# Patient Record
Sex: MALE | Race: WHITE | NOT HISPANIC OR LATINO | Employment: STUDENT | URBAN - METROPOLITAN AREA
[De-identification: names, ages, dates, MRNs, and addresses within clinical notes are randomized per-mention and may not be internally consistent; named-entity substitution may affect disease eponyms.]

---
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
## 2024-06-20 ENCOUNTER — LAB (OUTPATIENT)
Dept: LAB | Facility: LAB | Age: 19
End: 2024-06-20
Payer: COMMERCIAL

## 2024-06-20 ENCOUNTER — APPOINTMENT (OUTPATIENT)
Dept: PRIMARY CARE | Facility: CLINIC | Age: 19
End: 2024-06-20
Payer: COMMERCIAL

## 2024-06-20 VITALS
BODY MASS INDEX: 23.06 KG/M2 | RESPIRATION RATE: 16 BRPM | DIASTOLIC BLOOD PRESSURE: 76 MMHG | WEIGHT: 174 LBS | HEIGHT: 73 IN | HEART RATE: 56 BPM | SYSTOLIC BLOOD PRESSURE: 104 MMHG | TEMPERATURE: 97.3 F

## 2024-06-20 DIAGNOSIS — J34.2 NASAL SEPTAL DEVIATION: Primary | ICD-10-CM

## 2024-06-20 DIAGNOSIS — T78.40XA ALLERGY, INITIAL ENCOUNTER: ICD-10-CM

## 2024-06-20 PROCEDURE — 86003 ALLG SPEC IGE CRUDE XTRC EA: CPT

## 2024-06-20 PROCEDURE — 82785 ASSAY OF IGE: CPT

## 2024-06-20 PROCEDURE — 99213 OFFICE O/P EST LOW 20 MIN: CPT | Performed by: FAMILY MEDICINE

## 2024-06-20 PROCEDURE — 36415 COLL VENOUS BLD VENIPUNCTURE: CPT

## 2024-06-20 ASSESSMENT — ENCOUNTER SYMPTOMS
CHILLS: 0
SORE THROAT: 0
TROUBLE SWALLOWING: 0
FEVER: 0
SINUS PRESSURE: 1
ABDOMINAL PAIN: 0
RHINORRHEA: 1
SHORTNESS OF BREATH: 0
PALPITATIONS: 0
CHEST TIGHTNESS: 0

## 2024-06-20 NOTE — PROGRESS NOTES
"Subjective   Patient ID: Angelo Salinas is a 18 y.o. male who presents for Follow-up (Pt is here to discus a referral for a deviated septum and passable seasonal allergies and food allergies.  Pt states he always has trouble breathing from the left nostril. ).    HPI     Review of Systems   Constitutional:  Negative for chills and fever.   HENT:  Positive for congestion, rhinorrhea, sinus pressure and sneezing. Negative for sore throat and trouble swallowing.    Respiratory:  Negative for chest tightness and shortness of breath.    Cardiovascular:  Negative for chest pain, palpitations and leg swelling.   Gastrointestinal:  Negative for abdominal pain.   Skin:  Negative for rash.       Objective   /76   Pulse 56   Temp 36.3 °C (97.3 °F)   Resp 16   Ht 1.854 m (6' 1\")   Wt 78.9 kg (174 lb)   BMI 22.96 kg/m²     Physical Exam  Constitutional:       Appearance: Normal appearance.   HENT:      Head: Normocephalic.      Nose: Septal deviation and congestion present.      Left Nostril: Occlusion present.   Cardiovascular:      Rate and Rhythm: Normal rate and regular rhythm.   Pulmonary:      Effort: Pulmonary effort is normal.   Musculoskeletal:      Cervical back: Neck supple.         Assessment/Plan   Problem List Items Addressed This Visit    None  Visit Diagnoses         Codes    Nasal septal deviation    -  Primary J34.2    Relevant Orders    Referral to ENT    Allergy, initial encounter     T78.40XA    Relevant Orders    Respiratory Allergy Profile IgE    Food Allergy Profile IgE               "

## 2024-06-21 LAB
A ALTERNATA IGE QN: <0.1 KU/L
A FUMIGATUS IGE QN: <0.1 KU/L
BERMUDA GRASS IGE QN: 0.62 KU/L
BOXELDER IGE QN: 0.6 KU/L
C HERBARUM IGE QN: <0.1 KU/L
CALIF WALNUT POLN IGE QN: 0.67 KU/L
CAT DANDER IGE QN: <0.1 KU/L
CLAM IGE QN: 0.2 KU/L
CMN PIGWEED IGE QN: 0.65 KU/L
CODFISH IGE QN: <0.1 KU/L
COMMON RAGWEED IGE QN: 0.44 KU/L
CORN IGE QN: 0.97
COTTONWOOD IGE QN: 0.88 KU/L
D FARINAE IGE QN: 0.13 KU/L
D PTERONYSS IGE QN: <0.1 KU/L
DOG DANDER IGE QN: <0.1 KU/L
EGG WHITE IGE QN: <0.1 KU/L
ENGL PLANTAIN IGE QN: 0.53 KU/L
GOOSEFOOT IGE QN: 0.43 KU/L
JOHNSON GRASS IGE QN: 0.51 KU/L
KENT BLUE GRASS IGE QN: 0.56 KU/L
LONDON PLANE IGE QN: 0.91 KU/L
MILK IGE QN: 0.17 KU/L
MT JUNIPER IGE QN: 0.58 KU/L
P NOTATUM IGE QN: <0.1 KU/L
PEANUT IGE QN: 0.87 KU/L
PECAN/HICK TREE IGE QN: 0.49 KU/L
ROACH IGE QN: 0.25 KU/L
SALTWORT IGE QN: 0.43 KU/L
SCALLOP IGE QN: 0.17 KU/L
SESAME SEED IGE QN: 1.02 KU/L
SHEEP SORREL IGE QN: 0.47 KU/L
SHRIMP IGE QN: <0.1 KU/L
SILVER BIRCH IGE QN: 0.53 KU/L
SOYBEAN IGE QN: 0.46 KU/L
TIMOTHY IGE QN: 0.54 KU/L
TOTAL IGE SMQN RAST: 64.7 KU/L
WALNUT IGE QN: 0.7 KU/L
WHEAT IGE QN: 0.57 KU/L
WHITE ASH IGE QN: 0.76 KU/L
WHITE ELM IGE QN: 0.9 KU/L
WHITE MULBERRY IGE QN: 0.31 KU/L
WHITE OAK IGE QN: 0.6 KU/L

## 2024-07-02 ENCOUNTER — APPOINTMENT (OUTPATIENT)
Dept: OTOLARYNGOLOGY | Facility: CLINIC | Age: 19
End: 2024-07-02
Payer: COMMERCIAL

## 2024-07-02 DIAGNOSIS — J34.2 DNS (DEVIATED NASAL SEPTUM): Primary | ICD-10-CM

## 2024-07-02 PROCEDURE — 99203 OFFICE O/P NEW LOW 30 MIN: CPT | Performed by: PHYSICIAN ASSISTANT

## 2024-07-02 NOTE — PROGRESS NOTES
Angelo Salinas is a 18 y.o. year old male patient with Deviated Nasal Septum and Nasal Congestion     Patient presents to the office today for assessment of his nose.  Patient is here complaining of chronic neck congestion obstruction which seems to be worse left greater than right.  He states that this is been ongoing for several years but reports that this is increased.  He is utilize nasal steroids with minimal effectiveness.  He does have prior history of trauma which he states was orbital blowout fracture on the left side back in 2020.  He denies nasal bone fracture at that time.  He reports that he is not experiencing recurrent infection only nasal obstruction.  He does suffer from allergic rhinitis and does utilize antihistamine.  All other ENT issues are negative.      Review of Systems   All other systems reviewed and are negative.        Physical Exam:   General appearance: No acute distress. Normal facies. Symmetric facial movement. No gross lesions of the face are noted.  The external ear structures appear normal. The ear canals patent and the tympanic membranes are intact without evidence of air-fluid levels, retraction, or congenital defects.  Nasal septal deviation is noted to the left anteriorly right nasal vault patient is noted for deviation/septal splayed to the right with deformity.  Patient has prominent inferior nasal turbinates.  Examination is noted for normal healthy mucosal membranes without any evidence of lesions, polyps, or exudate. The tongue is normally mobile. There are no lesions on the gingiva, buccal, or oral mucosa. There are no oral cavity masses.  The neck is negative for mass lymphadenopathy. The trachea and parotid are clear. The thyroid bed is grossly unremarkable. The salivary gland structures are grossly unremarkable.    Assessment/Plan   1.  Deviated nasal septum  Patient seen in the office today for chronic nasal congestion and obstruction with nasal septal deviation.   The severity of the septal deviation and nasal obstruction the patient will be set up for dedicated CT imaging of the sinuses.  Will see the patient back following CT to review and discuss definitive septoplasty and turbinate surgery.

## 2024-07-16 ENCOUNTER — HOSPITAL ENCOUNTER (OUTPATIENT)
Dept: RADIOLOGY | Facility: HOSPITAL | Age: 19
Discharge: HOME | End: 2024-07-16
Payer: COMMERCIAL

## 2024-07-16 DIAGNOSIS — J34.2 DNS (DEVIATED NASAL SEPTUM): ICD-10-CM

## 2024-07-16 PROCEDURE — 70486 CT MAXILLOFACIAL W/O DYE: CPT | Performed by: RADIOLOGY

## 2024-07-16 PROCEDURE — 70486 CT MAXILLOFACIAL W/O DYE: CPT

## 2024-07-19 ENCOUNTER — OFFICE VISIT (OUTPATIENT)
Dept: PRIMARY CARE | Facility: CLINIC | Age: 19
End: 2024-07-19
Payer: COMMERCIAL

## 2024-07-19 VITALS
HEART RATE: 62 BPM | HEIGHT: 73 IN | BODY MASS INDEX: 22.53 KG/M2 | DIASTOLIC BLOOD PRESSURE: 60 MMHG | OXYGEN SATURATION: 96 % | SYSTOLIC BLOOD PRESSURE: 100 MMHG | WEIGHT: 170 LBS | RESPIRATION RATE: 18 BRPM | TEMPERATURE: 98.1 F

## 2024-07-19 DIAGNOSIS — H66.001 ACUTE SUPPURATIVE OTITIS MEDIA OF RIGHT EAR WITHOUT SPONTANEOUS RUPTURE OF TYMPANIC MEMBRANE, RECURRENCE NOT SPECIFIED: Primary | ICD-10-CM

## 2024-07-19 PROCEDURE — 99213 OFFICE O/P EST LOW 20 MIN: CPT | Performed by: FAMILY MEDICINE

## 2024-07-19 RX ORDER — METHYLPREDNISOLONE 4 MG/1
TABLET ORAL
Qty: 21 TABLET | Refills: 0 | Status: SHIPPED | OUTPATIENT
Start: 2024-07-19 | End: 2024-07-26

## 2024-07-19 RX ORDER — AMOXICILLIN AND CLAVULANATE POTASSIUM 875; 125 MG/1; MG/1
875 TABLET, FILM COATED ORAL 2 TIMES DAILY
Qty: 20 TABLET | Refills: 0 | Status: SHIPPED | OUTPATIENT
Start: 2024-07-19 | End: 2024-07-29

## 2024-07-19 ASSESSMENT — ENCOUNTER SYMPTOMS
SORE THROAT: 0
TROUBLE SWALLOWING: 0
SHORTNESS OF BREATH: 0
VOMITING: 0
NECK PAIN: 0
ABDOMINAL PAIN: 0
CHILLS: 0
PALPITATIONS: 0
FEVER: 0

## 2024-07-19 NOTE — PROGRESS NOTES
"Subjective   Angelo Salinas is a 18 y.o. male who presents for Hospital Follow-up (07.15.24) and Ear Fullness.  Patient is here to check right ear which is having difficulty hearing.  Patient states did a Ct and got result back that his ear is full and a little tender when pressing.  When to ER Monday 07.15.24 and had IV, tested neg  for COVID, Flu.  [facility] Department   Diagnose  Headache,Vomiting          Ear Fullness   There is pain in the right ear. The current episode started 1 to 4 weeks ago. The problem occurs constantly. The problem has been unchanged. There has been no fever. Associated symptoms include hearing loss. Pertinent negatives include no abdominal pain, ear discharge, neck pain, rash, sore throat or vomiting. He has tried nothing for the symptoms.     Review of Systems   Constitutional:  Negative for chills and fever.   HENT:  Positive for ear pain and hearing loss. Negative for ear discharge, sore throat and trouble swallowing.    Respiratory:  Negative for shortness of breath.    Cardiovascular:  Negative for chest pain, palpitations and leg swelling.   Gastrointestinal:  Negative for abdominal pain and vomiting.   Musculoskeletal:  Negative for neck pain.   Skin:  Negative for rash.       Objective   Physical Exam  Constitutional:       Appearance: Normal appearance.   HENT:      Head: Normocephalic.      Left Ear: Decreased hearing noted. Drainage present. A middle ear effusion is present. Tympanic membrane is bulging.   Cardiovascular:      Rate and Rhythm: Normal rate and regular rhythm.   Pulmonary:      Effort: Pulmonary effort is normal.   Musculoskeletal:      Cervical back: Neck supple.   Skin:     General: Skin is warm and dry.       /60 (BP Location: Right arm, Patient Position: Sitting, BP Cuff Size: Small adult)   Pulse 62   Temp 36.7 °C (98.1 °F)   Resp 18   Ht 1.854 m (6' 1\")   Wt 77.1 kg (170 lb)   SpO2 96%   BMI 22.43 kg/m²       Assessment/Plan "   Problem List Items Addressed This Visit    None  Visit Diagnoses       Acute suppurative otitis media of right ear without spontaneous rupture of tympanic membrane, recurrence not specified    -  Primary    Relevant Medications    amoxicillin-pot clavulanate (Augmentin) 875-125 mg tablet    methylPREDNISolone (Medrol Dospak) 4 mg tablets

## 2024-07-26 ENCOUNTER — APPOINTMENT (OUTPATIENT)
Dept: OTOLARYNGOLOGY | Facility: CLINIC | Age: 19
End: 2024-07-26
Payer: COMMERCIAL

## 2024-07-26 DIAGNOSIS — J34.2 DNS (DEVIATED NASAL SEPTUM): ICD-10-CM

## 2024-07-26 DIAGNOSIS — H65.01 NON-RECURRENT ACUTE SEROUS OTITIS MEDIA OF RIGHT EAR: Primary | ICD-10-CM

## 2024-07-26 PROCEDURE — 99213 OFFICE O/P EST LOW 20 MIN: CPT | Performed by: OTOLARYNGOLOGY

## 2024-07-26 RX ORDER — PREDNISONE 20 MG/1
TABLET ORAL
Qty: 9 TABLET | Refills: 0 | Status: SHIPPED | OUTPATIENT
Start: 2024-07-26

## 2024-07-26 NOTE — PROGRESS NOTES
The patient returns.  We are seeing him back today with his completed CT scan paranasal sinuses for evaluation of chronic nasal obstruction.  That examination did reveal evidence of far anterior nasal deflection consistent with previous exam findings.  He has had previous trauma in the past.  Additionally, he notes blockage in the right ear with possible fluid over the last several weeks.  He denies any other worrisome ENT symptoms or signs.  No prior history of ear tubes.  There have been no significant changes in past medical or past surgical histories except as mentioned.    Physical exam:  No acute distress.  The right ear is noted for henry effusion consistent with acute serous otitis by an intact drum.  The left external ear structures appear normal. The ear canals patent and the tympanic membranes is intact without evidence of air-fluid levels, retraction, or congenital defects.  Anterior rhinoscopy notes significant buckling far anterior left septum with elongation into the columella by the nasal septum. Examination is noted for normal healthy mucosal membranes without any evidence of lesions, polyps, or exudate. The tongue is normally mobile. There are no lesions on the gingiva, buccal, or oral mucosa. There are no oral cavity masses.  The neck is negative for mass lymphadenopathy. The trachea and parotid are clear. The thyroid bed is grossly unremarkable. The salivary gland structures are grossly unremarkable.    Assessment and plan:  1.  Severe nasal septal deformity with associated external contour deformity.  Will recommend he see our dedicated colleague Dr. Oscar for consideration of intervention surgically.  2.  Right acute serous otitis media to be treated with a 7-day taper prednisone.  He will update me on his response in the next 10 days.  All questions were answered in this regard accordingly.

## 2024-07-29 ENCOUNTER — APPOINTMENT (OUTPATIENT)
Dept: PRIMARY CARE | Facility: CLINIC | Age: 19
End: 2024-07-29
Payer: COMMERCIAL

## 2024-07-29 VITALS
BODY MASS INDEX: 22.26 KG/M2 | HEART RATE: 84 BPM | RESPIRATION RATE: 16 BRPM | WEIGHT: 168 LBS | DIASTOLIC BLOOD PRESSURE: 70 MMHG | TEMPERATURE: 96.7 F | HEIGHT: 73 IN | SYSTOLIC BLOOD PRESSURE: 120 MMHG

## 2024-07-29 DIAGNOSIS — G47.9 SLEEP DISTURBANCE: ICD-10-CM

## 2024-07-29 DIAGNOSIS — H60.502 ACUTE OTITIS EXTERNA OF LEFT EAR, UNSPECIFIED TYPE: Primary | ICD-10-CM

## 2024-07-29 DIAGNOSIS — R06.83 SNORING: ICD-10-CM

## 2024-07-29 PROCEDURE — 99213 OFFICE O/P EST LOW 20 MIN: CPT | Performed by: FAMILY MEDICINE

## 2024-07-29 RX ORDER — CIPROFLOXACIN AND DEXAMETHASONE 3; 1 MG/ML; MG/ML
4 SUSPENSION/ DROPS AURICULAR (OTIC) 2 TIMES DAILY
Qty: 7.5 ML | Refills: 0 | Status: SHIPPED | OUTPATIENT
Start: 2024-07-29 | End: 2024-08-05

## 2024-07-29 NOTE — PROGRESS NOTES
"Subjective   Patient ID: Angelo Salinas is a 18 y.o. male who presents for Follow-up (2 week follow up for right ear pain. Pt states no pain but he has noticed when he moves his head he can not hear out of his ear.  He states he is now having the same issues with the left ear that started 2 days ago. ).    HPI     Review of Systems   Constitutional:  Negative for chills and fever.   HENT:  Positive for ear discharge and ear pain.    Respiratory:  Positive for apnea. Negative for shortness of breath.         Snoring   Cardiovascular:  Negative for chest pain, palpitations and leg swelling.   Skin:  Negative for rash.       Objective   /70   Pulse 84   Temp 35.9 °C (96.7 °F)   Resp 16   Ht 1.854 m (6' 1\")   Wt 76.2 kg (168 lb)   BMI 22.16 kg/m²     Physical Exam  Constitutional:       Appearance: Normal appearance.   HENT:      Head: Normocephalic.      Right Ear: Drainage, swelling and tenderness present.   Pulmonary:      Effort: Pulmonary effort is normal.   Musculoskeletal:      Cervical back: Neck supple.   Skin:     General: Skin is warm and dry.   Psychiatric:         Mood and Affect: Mood normal.         Assessment/Plan   Problem List Items Addressed This Visit    None  Visit Diagnoses         Codes    Acute otitis externa of left ear, unspecified type    -  Primary H60.502    Relevant Medications    ciprofloxacin-dexamethasone (CiproDEX) otic suspension    Snoring     R06.83    Sleep disturbance     G47.9               "

## 2024-07-31 ASSESSMENT — ENCOUNTER SYMPTOMS
SHORTNESS OF BREATH: 0
FEVER: 0
PALPITATIONS: 0
CHILLS: 0
APNEA: 1

## 2024-08-05 DIAGNOSIS — G47.9 SLEEP DISTURBANCE: ICD-10-CM

## 2024-08-05 DIAGNOSIS — R06.83 SNORING: Primary | ICD-10-CM

## 2024-08-06 ENCOUNTER — PATIENT MESSAGE (OUTPATIENT)
Dept: PRIMARY CARE | Facility: CLINIC | Age: 19
End: 2024-08-06
Payer: COMMERCIAL

## 2024-08-06 DIAGNOSIS — H69.90 DYSFUNCTION OF EUSTACHIAN TUBE, UNSPECIFIED LATERALITY: Primary | ICD-10-CM

## 2024-08-07 NOTE — PROGRESS NOTES
Facial Plastic & Reconstructive Surgery    Reason for consult: Nasal obstruction    Referring provider: Dr. Montoya, [facility]    Chief Complaint: Obstructed breathing    Constant, present year round, does not fluctuate. It affects the patients ability to sleep, exercise, and is troubling during the day.  Symptoms began: Lifelong; worsened after orbital blowout in 2020    Nasal steroid or spray: Trial of flonase x 3 months failed; no benefit.    Site of obstruction: Left >Right    Previous Otolaryngology Provider: Dr. Montoya  Previous documentation for this patient was extensively reviewed including specific reasons for evaluation. Complex nature of complaint and medical issues prompting evaluation for surgical consideration by facial plastic & reconstructive surgeon.    Previous surgery: No    Previous CT/MRI imaging of the nasal cavity and sinuses:  I personally reviewed the CT Sinus from 7/16/24 and this is my impression: Rightward septal deviation, left caudal deflection  Internal nasal valve collapse bilaterally with contact of the upper lateral cartilages to septum            Trauma history: Yes, as a young child a satellite fell on face    Sleep apnea or snoring history: Snores    Allergic rhinitis, sinusitis history: Seasonal allergies , 3-4 sinus infections per year since COVID    Smoking: No    Aesthetic concern: Yes    Past Medical History  He has a past medical history of Closed fracture of left orbital floor (Multi) (10/12/2023), Closed nondisplaced fracture of fourth metatarsal bone of right foot (10/12/2023), Fatigue (10/12/2023), Fracture of maxillary sinus (Multi) (10/12/2023), Juvenile osteochondrosis of lower extremity, 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    Surgical History  He has a past surgical history that includes Other surgical history  (01/22/2020).     Social History  He reports that he has never smoked. He has never used smokeless tobacco. He reports that he does not drink alcohol and does not use drugs.    Family History  No family history on file.     Allergies  Avocado (laurus persea)    Physical exam    General: Well-developed and well-nourished in appearance.  Skin: No rashes or concerning lesions on the visible portions of the skin.  Eyes: Extraocular movements intact. Visual fields grossly normal.  Ears: Pinna are normal in shape and position. External canals are patent.  Oral Cavity/Oropharynx: Dentition is intact. Mucous membranes moist. No masses or lesions.  Respiratory: No respiratory distress. Quiet breathing without stertor or stridor.  Cardiovascular: Regular rate and rhythm. Warm extremities with equal pulses.  Psych: Normal mood and affect. Judgement and insight appropriate.  Neuro: Alert and oriented. CN II-XII grossly intact. No focal deficits.  Musculoskeletal: Gait intact. Moves all extremities well without apparent deformities.    A comprehensive facial exam was performed with the following highlights:    Nasal skin type: Moderate sebaceous    External exam:  Tip: Bulbous  Tip rotation: Appropriate  Projection: Overprojected  Dorsum: Irregularities, dorsal hump  Base width: Appropriate  Asymmetries: Left nostril with caudal septum inclusion, dorsal irregularities,  Alar columellar relationship: Columnella deviated right    Internal exam:   Septum: S shaped Right to Left deviation with right caudal deflection  Inferior turbinates: Hypertrophic Bilateral  Nasal valve angle: Decreased bilaterally L>R, severely narrowed middle vault    Intranasal examination performed with limited view on anterior rhinoscopy.    Procedures:    Procedure: Rigid diagnostic nasal endoscopy  Surgeon: Stephen Oscar MD  Anesthesia: None  Timeout: Performed  Findings: A 0-degree rigid endoscope was passed through the patient's bilateral naris. The  first pass was along the floor of the nose to the nasopharynx. The second pass was to the area of the middle meatus. The third pass was to the sphenoethmoidal recess.    Septum: Deviation is S shaped with bilateral obstruction approximately 90% without perforations nor synechia.  Internal Nasal Valve: Angle is reduced, narrowing the nasal airway bilaterally.    Right nasal cavity:  Inferior turbinate: 2+  Inferior meatus: Clear, no discharge, no polyps/masses/lesions  Middle meatus: Clear, no discharge, no polyps/masses/lesions    Left nasal cavity:  Inferior turbinate: 2+  Inferior meatus: Clear, no discharge, no polyps/masses/lesions  Middle meatus: Clear, no discharge, no polyps/masses/lesions  Nasopharynx: Clear, no discharge, no masses/lesions    Modified Richardson Maneuver: Performed with a cotton tipped applicator, markedly improves breathing bilaterally.    Assessment - This patient has a significant mechanical nasal obstruction. The cause is multifactorial with septal deviation with severe internal nasal valve narrowing, turbinate hypertrophy, and static internal nasal valve collapse. There is some dynamic nasal valve collapse as well. Correction of this nasal obstruction will require a septoplasty, repair of nasal valve collapse with structural grafting, and a bilateral turbinate reduction. We discussed the medical necessity of this at length, as well as the risks and limitations of the procedures. All questions were answered.      Plan - Recommend septoplasty, nasal valve repair with structural grafting, and inferior turbinate reduction with lateralization. We discussed aesthetic concerns as well.

## 2024-08-08 ENCOUNTER — APPOINTMENT (OUTPATIENT)
Dept: OTOLARYNGOLOGY | Facility: CLINIC | Age: 19
End: 2024-08-08
Payer: COMMERCIAL

## 2024-08-08 ENCOUNTER — OFFICE VISIT (OUTPATIENT)
Dept: OTOLARYNGOLOGY | Facility: CLINIC | Age: 19
End: 2024-08-08
Payer: COMMERCIAL

## 2024-08-08 VITALS — HEIGHT: 74 IN | BODY MASS INDEX: 21.55 KG/M2 | TEMPERATURE: 97.8 F | WEIGHT: 167.9 LBS

## 2024-08-08 DIAGNOSIS — R09.81 NASAL CONGESTION: ICD-10-CM

## 2024-08-08 DIAGNOSIS — J34.2 DEVIATED SEPTUM: ICD-10-CM

## 2024-08-08 DIAGNOSIS — R06.89 DIFFICULTY BREATHING: ICD-10-CM

## 2024-08-08 DIAGNOSIS — J34.3 HYPERTROPHY OF INFERIOR NASAL TURBINATE: ICD-10-CM

## 2024-08-08 DIAGNOSIS — J34.2 DEVIATED NASAL SEPTUM: ICD-10-CM

## 2024-08-08 DIAGNOSIS — J34.89 NASAL OBSTRUCTION: Primary | ICD-10-CM

## 2024-08-08 DIAGNOSIS — M95.0 NASAL DEFORMITY: ICD-10-CM

## 2024-08-08 ASSESSMENT — PATIENT HEALTH QUESTIONNAIRE - PHQ9
1. LITTLE INTEREST OR PLEASURE IN DOING THINGS: NOT AT ALL
SUM OF ALL RESPONSES TO PHQ9 QUESTIONS 1 AND 2: 0
2. FEELING DOWN, DEPRESSED OR HOPELESS: NOT AT ALL

## 2024-08-27 ENCOUNTER — TELEPHONE (OUTPATIENT)
Dept: OTOLARYNGOLOGY | Facility: CLINIC | Age: 19
End: 2024-08-27
Payer: COMMERCIAL

## 2024-08-28 ENCOUNTER — CLINICAL SUPPORT (OUTPATIENT)
Dept: SLEEP MEDICINE | Facility: HOSPITAL | Age: 19
End: 2024-08-28
Payer: COMMERCIAL

## 2024-08-28 VITALS — WEIGHT: 167.99 LBS | BODY MASS INDEX: 21.56 KG/M2 | HEIGHT: 74 IN

## 2024-08-28 DIAGNOSIS — R06.83 SNORING: ICD-10-CM

## 2024-08-28 DIAGNOSIS — G47.10 HYPERSOMNIA, UNSPECIFIED: ICD-10-CM

## 2024-08-28 DIAGNOSIS — F51.9 SLEEP DISORDER NOT DUE TO A SUBSTANCE OR KNOWN PHYSIOLOGICAL CONDITION, UNSPECIFIED: ICD-10-CM

## 2024-08-28 DIAGNOSIS — G47.9 SLEEP DISTURBANCE: ICD-10-CM

## 2024-08-28 PROCEDURE — 95810 POLYSOM 6/> YRS 4/> PARAM: CPT | Performed by: SPECIALIST

## 2024-08-28 ASSESSMENT — SLEEP AND FATIGUE QUESTIONNAIRES
HOW LIKELY ARE YOU TO NOD OFF OR FALL ASLEEP IN A CAR, WHILE STOPPED FOR A FEW MINUTES IN TRAFFIC: WOULD NEVER DOZE
HOW LIKELY ARE YOU TO NOD OFF OR FALL ASLEEP WHEN YOU ARE A PASSENGER IN A CAR FOR AN HOUR WITHOUT A BREAK: MODERATE CHANCE OF DOZING
HOW LIKELY ARE YOU TO NOD OFF OR FALL ASLEEP WHILE SITTING AND TALKING TO SOMEONE: SLIGHT CHANCE OF DOZING
HOW LIKELY ARE YOU TO NOD OFF OR FALL ASLEEP WHILE LYING DOWN TO REST IN THE AFTERNOON WHEN CIRCUMSTANCES PERMIT: HIGH CHANCE OF DOZING
HOW LIKELY ARE YOU TO NOD OFF OR FALL ASLEEP WHILE WATCHING TV: SLIGHT CHANCE OF DOZING
SITING INACTIVE IN A PUBLIC PLACE LIKE A CLASS ROOM OR A MOVIE THEATER: SLIGHT CHANCE OF DOZING
ESS-CHAD TOTAL SCORE: 9
HOW LIKELY ARE YOU TO NOD OFF OR FALL ASLEEP WHILE SITTING QUIETLY AFTER LUNCH WITHOUT ALCOHOL: WOULD NEVER DOZE
HOW LIKELY ARE YOU TO NOD OFF OR FALL ASLEEP WHILE SITTING AND READING: SLIGHT CHANCE OF DOZING

## 2024-08-29 NOTE — PROGRESS NOTES
[facility] TECH NOTE:     Patient: Angelo Salinas   MRN//AGE: [id number]  2005  18 y.o.   Technologist: Slava Le   Room: 402   Service Date: 2024        [place] [facility] [place]: 9    TECHNOLOGIST SLEEP STUDY PROCEDURE NOTE:   This sleep study is being conducted according to the policies and procedures outlined by the [employer]AS accreditation standards.  The sleep study procedure and processes involved during this appointment was explained to the patient/patient’s family, questions were answered. The patient/family verbalized understanding.      The patient is a 18 y.o. year old male scheduled for a Diagnostic PSG Split night with montage of:  Split . he arrived for his appointment.      The study that was ultimately completed was a Diagnostic PSG Split night with montage of:  PSG .    The full study Was completed.  Patient questionnaires completed?: yes     Consents signed? yes    Initial Fall Risk Screening:     Angelo has not fallen in the last 6 months. his fall did not result in injury. Angelo does not have a fear of falling. He does not need assistance with sitting, standing, or walking. he does not need assistance walking in his home. he does not need assistance in an unfamiliar setting. The patient is not using an assistive device.     Brief Study observations: The patient arrived for his ordered Split study. The hook-up was done and the purpose of all sensors was explained. A longer than normal sleep latency was seen. Once asleep, no snoring was seen/heard. Few/no respiratory events were noted. All sleep stages were observed. REM supine was not captured. The patient was observed sleeping in right side, and left side positions. NSR was observed throughout the study. The patient woke up around 1AM and continued awake. Around 2:30AM the patient stated that he was not going to be able to go back to sleep and he was considering ending the study. The tech advised the pt about 6 hours of  recording being recommended, but that it was up to the patient whether he wanted to continue or not. The tech encouraged the patient to watch something and try to see if he could go back to sleep for a while, and to let the tech know if/when he wanted to end the study. The pt verbalized understanding and watched things on his phone, unable to go back to sleep. The study was ended by request at 4:41AM, after >6 hours of recording.     Deviation to order/protocol and reason: N/A      If PAP, which was preferred mask/pressure/mode: N/A      Other: None    After the procedure, the patient/family was informed to ensure followup with ordering clinician for testing results.      Technologist: Slava Le

## 2024-09-03 ENCOUNTER — TELEPHONE (OUTPATIENT)
Dept: PRIMARY CARE | Facility: CLINIC | Age: 19
End: 2024-09-03
Payer: COMMERCIAL

## 2024-09-03 NOTE — TELEPHONE ENCOUNTER
----- Message from Cm Epstein sent at 9/2/2024  5:58 PM EDT -----  Call Angelo Salinas Their labs were normal  His sleep study was normal

## 2024-09-04 ENCOUNTER — TELEPHONE (OUTPATIENT)
Dept: OTOLARYNGOLOGY | Facility: CLINIC | Age: 19
End: 2024-09-04
Payer: COMMERCIAL

## 2024-09-04 NOTE — TELEPHONE ENCOUNTER
Called left voicemail to inquire if patient wants to move forward with cosmetic portion of his surgery with Dr. Oscar.

## 2024-09-05 DIAGNOSIS — J34.3 HYPERTROPHY OF NASAL TURBINATES: ICD-10-CM

## 2024-09-05 DIAGNOSIS — R09.81 NASAL CONGESTION: ICD-10-CM

## 2024-09-05 DIAGNOSIS — M95.0 ACQUIRED DEFORMITY OF NOSE: ICD-10-CM

## 2024-09-05 DIAGNOSIS — J34.2 DEVIATED NASAL SEPTUM: ICD-10-CM

## 2024-09-17 ENCOUNTER — HOSPITAL ENCOUNTER (OUTPATIENT)
Facility: CLINIC | Age: 19
Setting detail: OUTPATIENT SURGERY
Discharge: HOME | End: 2024-09-17
Attending: OTOLARYNGOLOGY | Admitting: OTOLARYNGOLOGY
Payer: COMMERCIAL

## 2024-09-17 ENCOUNTER — ANESTHESIA EVENT (OUTPATIENT)
Dept: OPERATING ROOM | Facility: CLINIC | Age: 19
End: 2024-09-17
Payer: COMMERCIAL

## 2024-09-17 ENCOUNTER — ANESTHESIA (OUTPATIENT)
Dept: OPERATING ROOM | Facility: CLINIC | Age: 19
End: 2024-09-17
Payer: COMMERCIAL

## 2024-09-17 VITALS
TEMPERATURE: 97.2 F | HEIGHT: 74 IN | RESPIRATION RATE: 18 BRPM | WEIGHT: 168.21 LBS | OXYGEN SATURATION: 97 % | BODY MASS INDEX: 21.59 KG/M2 | HEART RATE: 54 BPM | SYSTOLIC BLOOD PRESSURE: 122 MMHG | DIASTOLIC BLOOD PRESSURE: 88 MMHG

## 2024-09-17 DIAGNOSIS — R09.81 NASAL CONGESTION: Primary | ICD-10-CM

## 2024-09-17 DIAGNOSIS — M95.0 ACQUIRED DEFORMITY OF NOSE: ICD-10-CM

## 2024-09-17 DIAGNOSIS — G89.18 POSTOPERATIVE PAIN: ICD-10-CM

## 2024-09-17 DIAGNOSIS — J34.2 DEVIATED NASAL SEPTUM: ICD-10-CM

## 2024-09-17 DIAGNOSIS — J34.3 HYPERTROPHY OF NASAL TURBINATES: ICD-10-CM

## 2024-09-17 PROCEDURE — 2500000004 HC RX 250 GENERAL PHARMACY W/ HCPCS (ALT 636 FOR OP/ED): Mod: SE | Performed by: OTOLARYNGOLOGY

## 2024-09-17 PROCEDURE — 2720000007 HC OR 272 NO HCPCS: Performed by: OTOLARYNGOLOGY

## 2024-09-17 PROCEDURE — 96372 THER/PROPH/DIAG INJ SC/IM: CPT | Performed by: OTOLARYNGOLOGY

## 2024-09-17 PROCEDURE — 7100000002 HC RECOVERY ROOM TIME - EACH INCREMENTAL 1 MINUTE: Performed by: OTOLARYNGOLOGY

## 2024-09-17 PROCEDURE — 7100000009 HC PHASE TWO TIME - INITIAL BASE CHARGE: Performed by: OTOLARYNGOLOGY

## 2024-09-17 PROCEDURE — 7100000001 HC RECOVERY ROOM TIME - INITIAL BASE CHARGE: Performed by: OTOLARYNGOLOGY

## 2024-09-17 PROCEDURE — 2500000001 HC RX 250 WO HCPCS SELF ADMINISTERED DRUGS (ALT 637 FOR MEDICARE OP): Mod: SE | Performed by: ANESTHESIOLOGIST ASSISTANT

## 2024-09-17 PROCEDURE — 2500000005 HC RX 250 GENERAL PHARMACY W/O HCPCS: Mod: SE | Performed by: ANESTHESIOLOGIST ASSISTANT

## 2024-09-17 PROCEDURE — 2500000005 HC RX 250 GENERAL PHARMACY W/O HCPCS: Mod: SE | Performed by: OTOLARYNGOLOGY

## 2024-09-17 PROCEDURE — A30930 PR THERAPUTIC FRACTURE INFER TURBINATE: Performed by: ANESTHESIOLOGIST ASSISTANT

## 2024-09-17 PROCEDURE — 30802 ABLATE INF TURBINATE SUBMUC: CPT | Performed by: OTOLARYNGOLOGY

## 2024-09-17 PROCEDURE — 2500000001 HC RX 250 WO HCPCS SELF ADMINISTERED DRUGS (ALT 637 FOR MEDICARE OP): Mod: SE | Performed by: OTOLARYNGOLOGY

## 2024-09-17 PROCEDURE — 7100000010 HC PHASE TWO TIME - EACH INCREMENTAL 1 MINUTE: Performed by: OTOLARYNGOLOGY

## 2024-09-17 PROCEDURE — 2500000004 HC RX 250 GENERAL PHARMACY W/ HCPCS (ALT 636 FOR OP/ED): Mod: SE | Performed by: ANESTHESIOLOGIST ASSISTANT

## 2024-09-17 PROCEDURE — 3600000002 HC OR TIME - INITIAL BASE CHARGE - PROCEDURE LEVEL TWO: Performed by: OTOLARYNGOLOGY

## 2024-09-17 PROCEDURE — 30465 REPAIR NASAL STENOSIS: CPT | Performed by: OTOLARYNGOLOGY

## 2024-09-17 PROCEDURE — 2500000002 HC RX 250 W HCPCS SELF ADMINISTERED DRUGS (ALT 637 FOR MEDICARE OP, ALT 636 FOR OP/ED): Mod: SE | Performed by: ANESTHESIOLOGIST ASSISTANT

## 2024-09-17 PROCEDURE — 20912 REMOVE CARTILAGE FOR GRAFT: CPT | Performed by: OTOLARYNGOLOGY

## 2024-09-17 PROCEDURE — 3700000002 HC GENERAL ANESTHESIA TIME - EACH INCREMENTAL 1 MINUTE: Performed by: OTOLARYNGOLOGY

## 2024-09-17 PROCEDURE — 3700000001 HC GENERAL ANESTHESIA TIME - INITIAL BASE CHARGE: Performed by: OTOLARYNGOLOGY

## 2024-09-17 PROCEDURE — 30930 THER FX NASAL INF TURBINATE: CPT | Performed by: OTOLARYNGOLOGY

## 2024-09-17 PROCEDURE — 30520 REPAIR OF NASAL SEPTUM: CPT | Performed by: OTOLARYNGOLOGY

## 2024-09-17 PROCEDURE — A30930 PR THERAPUTIC FRACTURE INFER TURBINATE: Performed by: ANESTHESIOLOGY

## 2024-09-17 PROCEDURE — 3600000007 HC OR TIME - EACH INCREMENTAL 1 MINUTE - PROCEDURE LEVEL TWO: Performed by: OTOLARYNGOLOGY

## 2024-09-17 RX ORDER — LIDOCAINE HYDROCHLORIDE AND EPINEPHRINE 10; 10 MG/ML; UG/ML
INJECTION, SOLUTION INFILTRATION; PERINEURAL AS NEEDED
Status: DISCONTINUED | OUTPATIENT
Start: 2024-09-17 | End: 2024-09-17 | Stop reason: HOSPADM

## 2024-09-17 RX ORDER — METOCLOPRAMIDE HYDROCHLORIDE 5 MG/ML
10 INJECTION INTRAMUSCULAR; INTRAVENOUS ONCE AS NEEDED
Status: DISCONTINUED | OUTPATIENT
Start: 2024-09-17 | End: 2024-09-17 | Stop reason: HOSPADM

## 2024-09-17 RX ORDER — ONDANSETRON 4 MG/1
4 TABLET, FILM COATED ORAL EVERY 6 HOURS PRN
Qty: 10 TABLET | Refills: 0 | Status: SHIPPED | OUTPATIENT
Start: 2024-09-17 | End: 2024-09-24

## 2024-09-17 RX ORDER — PROPOFOL 10 MG/ML
INJECTION, EMULSION INTRAVENOUS AS NEEDED
Status: DISCONTINUED | OUTPATIENT
Start: 2024-09-17 | End: 2024-09-17

## 2024-09-17 RX ORDER — MUPIROCIN 20 MG/G
OINTMENT TOPICAL AS NEEDED
Status: DISCONTINUED | OUTPATIENT
Start: 2024-09-17 | End: 2024-09-17 | Stop reason: HOSPADM

## 2024-09-17 RX ORDER — OXYMETAZOLINE HCL 0.05 %
SPRAY, NON-AEROSOL (ML) NASAL AS NEEDED
Status: DISCONTINUED | OUTPATIENT
Start: 2024-09-17 | End: 2024-09-17 | Stop reason: HOSPADM

## 2024-09-17 RX ORDER — SODIUM CHLORIDE, SODIUM LACTATE, POTASSIUM CHLORIDE, CALCIUM CHLORIDE 600; 310; 30; 20 MG/100ML; MG/100ML; MG/100ML; MG/100ML
100 INJECTION, SOLUTION INTRAVENOUS CONTINUOUS
Status: DISCONTINUED | OUTPATIENT
Start: 2024-09-17 | End: 2024-09-17 | Stop reason: HOSPADM

## 2024-09-17 RX ORDER — ALBUTEROL SULFATE 0.83 MG/ML
2.5 SOLUTION RESPIRATORY (INHALATION) ONCE AS NEEDED
Status: DISCONTINUED | OUTPATIENT
Start: 2024-09-17 | End: 2024-09-17 | Stop reason: HOSPADM

## 2024-09-17 RX ORDER — GABAPENTIN 300 MG/1
CAPSULE ORAL AS NEEDED
Status: DISCONTINUED | OUTPATIENT
Start: 2024-09-17 | End: 2024-09-17

## 2024-09-17 RX ORDER — DOCUSATE SODIUM 100 MG/1
100 CAPSULE, LIQUID FILLED ORAL 2 TIMES DAILY PRN
Qty: 60 CAPSULE | Refills: 0 | Status: SHIPPED | OUTPATIENT
Start: 2024-09-17

## 2024-09-17 RX ORDER — LIDOCAINE HYDROCHLORIDE 20 MG/ML
INJECTION, SOLUTION INFILTRATION; PERINEURAL AS NEEDED
Status: DISCONTINUED | OUTPATIENT
Start: 2024-09-17 | End: 2024-09-17

## 2024-09-17 RX ORDER — KETOROLAC TROMETHAMINE 30 MG/ML
INJECTION, SOLUTION INTRAMUSCULAR; INTRAVENOUS AS NEEDED
Status: DISCONTINUED | OUTPATIENT
Start: 2024-09-17 | End: 2024-09-17

## 2024-09-17 RX ORDER — LIDOCAINE IN NACL,ISO-OSMOT/PF 30 MG/3 ML
0.1 SYRINGE (ML) INJECTION ONCE
Status: DISCONTINUED | OUTPATIENT
Start: 2024-09-17 | End: 2024-09-17 | Stop reason: HOSPADM

## 2024-09-17 RX ORDER — ONDANSETRON HYDROCHLORIDE 2 MG/ML
4 INJECTION, SOLUTION INTRAVENOUS ONCE AS NEEDED
Status: DISCONTINUED | OUTPATIENT
Start: 2024-09-17 | End: 2024-09-17 | Stop reason: HOSPADM

## 2024-09-17 RX ORDER — TRANEXAMIC ACID 100 MG/ML
INJECTION, SOLUTION INTRAVENOUS AS NEEDED
Status: DISCONTINUED | OUTPATIENT
Start: 2024-09-17 | End: 2024-09-17 | Stop reason: HOSPADM

## 2024-09-17 RX ORDER — SODIUM CHLORIDE, SODIUM LACTATE, POTASSIUM CHLORIDE, CALCIUM CHLORIDE 600; 310; 30; 20 MG/100ML; MG/100ML; MG/100ML; MG/100ML
INJECTION, SOLUTION INTRAVENOUS CONTINUOUS PRN
Status: DISCONTINUED | OUTPATIENT
Start: 2024-09-17 | End: 2024-09-17

## 2024-09-17 RX ORDER — GLYCOPYRROLATE 0.2 MG/ML
INJECTION INTRAMUSCULAR; INTRAVENOUS AS NEEDED
Status: DISCONTINUED | OUTPATIENT
Start: 2024-09-17 | End: 2024-09-17

## 2024-09-17 RX ORDER — ONDANSETRON HYDROCHLORIDE 2 MG/ML
INJECTION, SOLUTION INTRAVENOUS AS NEEDED
Status: DISCONTINUED | OUTPATIENT
Start: 2024-09-17 | End: 2024-09-17

## 2024-09-17 RX ORDER — HYDROMORPHONE HYDROCHLORIDE 0.2 MG/ML
0.2 INJECTION INTRAMUSCULAR; INTRAVENOUS; SUBCUTANEOUS EVERY 5 MIN PRN
Status: DISCONTINUED | OUTPATIENT
Start: 2024-09-17 | End: 2024-09-17 | Stop reason: HOSPADM

## 2024-09-17 RX ORDER — CEFAZOLIN 1 G/1
INJECTION, POWDER, FOR SOLUTION INTRAVENOUS AS NEEDED
Status: DISCONTINUED | OUTPATIENT
Start: 2024-09-17 | End: 2024-09-17

## 2024-09-17 RX ORDER — EPINEPHRINE 1 MG/ML
INJECTION, SOLUTION, CONCENTRATE INTRAVENOUS AS NEEDED
Status: DISCONTINUED | OUTPATIENT
Start: 2024-09-17 | End: 2024-09-17 | Stop reason: HOSPADM

## 2024-09-17 RX ORDER — MIDAZOLAM HYDROCHLORIDE 1 MG/ML
INJECTION, SOLUTION INTRAMUSCULAR; INTRAVENOUS AS NEEDED
Status: DISCONTINUED | OUTPATIENT
Start: 2024-09-17 | End: 2024-09-17

## 2024-09-17 RX ORDER — OXYCODONE HYDROCHLORIDE 5 MG/1
5 TABLET ORAL EVERY 6 HOURS PRN
Qty: 12 TABLET | Refills: 0 | Status: SHIPPED | OUTPATIENT
Start: 2024-09-17 | End: 2024-09-19 | Stop reason: SDUPTHER

## 2024-09-17 RX ORDER — SODIUM CHLORIDE 0.65 %
2 AEROSOL, SPRAY (ML) NASAL
Qty: 44 ML | Refills: 3 | Status: SHIPPED | OUTPATIENT
Start: 2024-09-17

## 2024-09-17 RX ORDER — CEPHALEXIN 500 MG/1
500 CAPSULE ORAL 2 TIMES DAILY
Qty: 8 CAPSULE | Refills: 0 | Status: SHIPPED | OUTPATIENT
Start: 2024-09-17 | End: 2024-09-21

## 2024-09-17 RX ORDER — OXYCODONE HYDROCHLORIDE 5 MG/1
5 TABLET ORAL EVERY 4 HOURS PRN
Status: DISCONTINUED | OUTPATIENT
Start: 2024-09-17 | End: 2024-09-17 | Stop reason: HOSPADM

## 2024-09-17 RX ORDER — HYDROMORPHONE HYDROCHLORIDE 1 MG/ML
0.4 INJECTION, SOLUTION INTRAMUSCULAR; INTRAVENOUS; SUBCUTANEOUS EVERY 5 MIN PRN
Status: DISCONTINUED | OUTPATIENT
Start: 2024-09-17 | End: 2024-09-17 | Stop reason: HOSPADM

## 2024-09-17 RX ORDER — APREPITANT 40 MG/1
CAPSULE ORAL AS NEEDED
Status: DISCONTINUED | OUTPATIENT
Start: 2024-09-17 | End: 2024-09-17

## 2024-09-17 RX ORDER — FENTANYL CITRATE 50 UG/ML
INJECTION, SOLUTION INTRAMUSCULAR; INTRAVENOUS AS NEEDED
Status: DISCONTINUED | OUTPATIENT
Start: 2024-09-17 | End: 2024-09-17

## 2024-09-17 RX ORDER — ACETAMINOPHEN 325 MG/1
TABLET ORAL AS NEEDED
Status: DISCONTINUED | OUTPATIENT
Start: 2024-09-17 | End: 2024-09-17

## 2024-09-17 SDOH — HEALTH STABILITY: MENTAL HEALTH: CURRENT SMOKER: 0

## 2024-09-17 ASSESSMENT — COLUMBIA-SUICIDE SEVERITY RATING SCALE - C-SSRS
2. HAVE YOU ACTUALLY HAD ANY THOUGHTS OF KILLING YOURSELF?: NO
1. IN THE PAST MONTH, HAVE YOU WISHED YOU WERE DEAD OR WISHED YOU COULD GO TO SLEEP AND NOT WAKE UP?: NO
6. HAVE YOU EVER DONE ANYTHING, STARTED TO DO ANYTHING, OR PREPARED TO DO ANYTHING TO END YOUR LIFE?: NO

## 2024-09-17 ASSESSMENT — PAIN SCALES - GENERAL
PAINLEVEL_OUTOF10: 0 - NO PAIN
PAINLEVEL_OUTOF10: 4

## 2024-09-17 ASSESSMENT — PAIN - FUNCTIONAL ASSESSMENT
PAIN_FUNCTIONAL_ASSESSMENT: 0-10

## 2024-09-17 ASSESSMENT — PAIN DESCRIPTION - DESCRIPTORS: DESCRIPTORS: ACHING

## 2024-09-17 NOTE — ANESTHESIA PREPROCEDURE EVALUATION
Patient: Angelo Salinas    Procedure Information       Anesthesia Start Date/Time: 09/17/24 0731    Procedure: septoplasty, septal graft, nasal valve repair, turbinate reduction (Bilateral)    [facility]    Surgeons: Stephen Oscar MD            Relevant Problems   No relevant active problems       Clinical information reviewed: No acute illnesses   Tobacco  Allergies  Meds   Med Hx  Surg Hx   Fam Hx  Soc Hx        NPO Detail:  NPO/Void Status  Date of Last Liquid: 09/16/24  Time of Last Liquid: 2300  Date of Last Solid: 09/16/24  Time of Last Solid: 2300         Physical Exam    Airway  Mallampati: II  Neck ROM: full     Cardiovascular   Rhythm: regular  Rate: normal     Dental    Pulmonary   Breath sounds clear to auscultation     Abdominal      Other findings: Possible difficult airway, narrow mouth- anterior      Anesthesia Plan    History of general anesthesia?: yes  History of complications of general anesthesia?: no    ASA 1     general     The patient is not a current smoker.    intravenous induction   Anesthetic plan and risks discussed with patient.    Plan discussed with [facility].

## 2024-09-17 NOTE — H&P
"History Of Present Illness  Angelo Salinas is a 18 y.o. male presenting with obstructed nasal breathing.     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    Surgical History  He has a past surgical history that includes Other surgical history (01/22/2020); Tonsillectomy; and Adenoidectomy.     Social History  He reports that he has never smoked. He has never used smokeless tobacco. He reports that he does not drink alcohol and does not use drugs.    Family History  No family history on file.     Allergies  Avocado (laurus persea)    Review of Systems     Physical Exam  General: no acute distress  Card: acyanotic  Pulm: no increased WOB on RA     Last Recorded Vitals  Blood pressure 112/66, pulse (!) 48, temperature 36 °C (96.8 °F), temperature source Temporal, resp. rate 16, height 1.88 m (6' 2\"), weight 76.3 kg (168 lb 3.4 oz), SpO2 99%.       Assessment/Plan   Assessment & Plan  Deviated nasal septum    Hypertrophy of nasal turbinates    Acquired deformity of nose    Nasal congestion      Septoplasty, possible open, nasal valve repair, turbinate reduction        Sujit Borjas MD    "

## 2024-09-17 NOTE — OP NOTE
septoplasty, septal graft, nasal valve repair, turbinate reduction (B) Operative Note     Date: 2024  OR Location: [facility] OR    Name: Angelo Salinas, : 2005, Age: 18 y.o., MRN: [id number], Sex: male    Diagnosis  Pre-op Diagnosis      * Deviated nasal septum [J34.2]     * Hypertrophy of nasal turbinates [J34.3]     * Acquired deformity of nose [M95.0]     * Nasal congestion [R09.81] Post-op Diagnosis     * Deviated nasal septum [J34.2]     * Hypertrophy of nasal turbinates [J34.3]     * Acquired deformity of nose [M95.0]     * Nasal congestion [R09.81]     Procedures  septoplasty, septal graft, nasal valve repair, turbinate reduction  [id number] - ID SEPTOPLASTY/SUBMUCOUS RESECJ W/WO CARTILAGE GRF  [id number] - ID CARTILAGE GRAFT NASAL SEPTUM  [id number] - ID REPAIR NASAL VESTIBULAR STENOSIS  [id number] - ID FRACTURE NASAL INFERIOR TURBINATE THERAPEUTIC  [id number] - ID ABLTJ SOF TISS INF TURBS UNI/BI SUPFC INTRAMURAL  Surgeons      * Stephen Oscar - Primary    Resident/Fellow/Other Assistant:  Surgeons and Role:  * No surgeons found with a matching role *    Procedure Summary  Anesthesia: Anesthesia type not filed in the log.  ASA: ASA status not filed in the log.  Anesthesia Staff: Anesthesiologist: Miquel Odom MD  C-AA: HERBERT Stockton  Estimated Blood Loss: 10 mL  Intra-op Medications:   Administrations occurring from 0730 to 0945 on 24:   Medication Name Total Dose   oxymetazoline (Afrin) 0.05 % nasal spray 3 spray   lidocaine-epinephrine (Xylocaine W/EPI) 1 %-1:100,000 injection 18 mL   tranexamic acid (Cyklokapron) injection 1.2 g   balanced salts (BSS) intraocular solution 5 mL   EPINEPHrine HCl (PF) (Adrenalin) injection 2 mg   mupirocin (Bactroban) 2 % ointment 1 Application              Anesthesia Record               Intraprocedure I/O Totals          Intake    LR infusion 600.00 mL    Total Intake 600 mL       Output    Est. Blood Loss 10 mL    Total Output 10 mL       Net    Net  Volume 590 mL          Specimen: No specimens collected     Staff:   Circulator: Priya Gomezub Person: Lyudmila Patel Scrub: Maria Luz Patel Circulator: Maria Luz         Drains and/or Catheters: * None in log *    Tourniquet Times:         Implants:     Findings: see op note    Indications: Angelo Salinas is an 18 y.o. male who is having surgery for Deviated nasal septum [J34.2]  Hypertrophy of nasal turbinates [J34.3]  Acquired deformity of nose [M95.0]  Nasal congestion [R09.81].     The patient was seen in the preoperative area. The risks, benefits, complications, treatment options, non-operative alternatives, expected recovery and outcomes were discussed with the patient. The possibilities of reaction to medication, pulmonary aspiration, injury to surrounding structures, bleeding, recurrent infection, the need for additional procedures, failure to diagnose a condition, and creating a complication requiring transfusion or operation were discussed with the patient. The patient concurred with the proposed plan, giving informed consent.  The site of surgery was properly noted/marked if necessary per policy. The patient has been actively warmed in preoperative area. Preoperative antibiotics have been ordered and given within 1 hours of incision. Venous thrombosis prophylaxis have been ordered including bilateral sequential compression devices    Procedure Details:     The patient presented with nasal obstruction.  The patient was found to have significant nasal septal deviation, nasal valve collapse, and inferior turbinate hypertrophy.  I discussed with the patient, in detail, the risks, benefits, limitations, and alternatives to the planned procedures. Risks discussed included but were not limited to infection, bleeding, septal perforation, synechia, need for further surgery, failure to achieve our desired results, worsened breathing, and worsened appearance. The patient expressed understanding of each of these  complications and had all questions answered.    DESCRIPTION OF PROCEDURE:  The patient was brought to the operating room and placed in the supine position, then intubated under general anesthesia.  The nose was injected with 1% lidocaine with epinephrine and then packed with decongestant-soaked pledgets.  The face was prepped and draped in the usual sterile fashion.  A transcolumellar incision was performed in an inverted-V fashion, which was continued into marginal incisions bilaterally using sharp scissors.  The skin-soft tissue envelope was elevated in the supraperichondrial plane sharply over the nasal tip and midvault.  The periosteum overlying the bony dorsum was then carefully elevated.  The anterior septal angle was identified and mucoperichondrial flaps were elevated bilaterally.  The upper lateral cartilages were then  from the dorsal septum bilaterally.   The deviated septum was treated by removing deformed quadrangular cartilage and bony septum    Septal cartilage and bone was harvested, taking care to preserve a >1.0 cm L-shaped strut.       grafts were then created using previously harvested septal cartilage, and suture fixed with 5-0 PDS in between the upper lateral cartilage and septum bilaterally, in order to treat the internal nasal valve, stabilize the midvault, and control/optimize the midvault width.    The nasal tip was stabilized with the use of: suture fixation of septum to nasal spine and columellar strut.    Bilateral inferior turbinate reduction and lateralization was then performed.  A [place] fine needle tip bovie electrocautery was used to perform intramural cauterization for submucous resection bilaterally.  A Nash elevator was then used to outfracture the inferior turbinate bilaterally.     We then turned our attention to closure.  Meticulous skin closure was performed using 6-0 prolene in a simple, interrupted fashion.  The nose was gently cleansed with sterile  saline and an external nasal splint was placed in the usual fashion.  This concluded the goals of the procedure.  The patient was turned over to Anesthesia, extubated, and transferred to the PACU.    Complications:  None; patient tolerated the procedure well.    Disposition: PACU - hemodynamically stable.  Condition: stable         Additional Details: None    Attending Attestation: I was present and scrubbed for the entire procedure.    Stephen Oscar  Phone Number: [phone]

## 2024-09-17 NOTE — ANESTHESIA POSTPROCEDURE EVALUATION
Patient: Angelo Salinas    Procedure Summary       Date: 09/17/24 Room / Location: [facility] OR 03 / [facility]    Anesthesia Start: 0731 Anesthesia Stop: 1001    Procedure: septoplasty, septal graft, nasal valve repair, turbinate reduction (Bilateral) Diagnosis:       Deviated nasal septum      Hypertrophy of nasal turbinates      Acquired deformity of nose      Nasal congestion      (Deviated nasal septum [J34.2])      (Hypertrophy of nasal turbinates [J34.3])      (Acquired deformity of nose [M95.0])      (Nasal congestion [R09.81])    Surgeons: Stephen Oscar MD Responsible Provider: Miquel Odom MD    Anesthesia Type: general ASA Status: 1            Anesthesia Type: general    Vitals Value Taken Time   /67 09/17/24 1044   Temp 36.4 °C (97.5 °F) 09/17/24 1044   Pulse 61 09/17/24 1044   Resp 16 09/17/24 1044   SpO2 97 % 09/17/24 1044       Anesthesia Post Evaluation    Patient location during evaluation: PACU  Patient participation: complete - patient participated  Level of consciousness: awake  Pain management: satisfactory to patient  Multimodal analgesia pain management approach  Airway patency: patent  Cardiovascular status: stable  Respiratory status: acceptable and room air  Hydration status: acceptable  Postoperative Nausea and Vomiting: none  Comments: Did well    There were no known notable events for this encounter.

## 2024-09-17 NOTE — DISCHARGE INSTRUCTIONS
FACIAL PLASTIC SURGERY POSTOPERATIVE INSTRUCTIONS    NASAL SURGERY  Important Phone Numbers  Dr. Stephen Oscar: [phone]  Lela Bustamante R.N.  Evenings/Weekends Emergency: [phone]  - please ask for the ENT resident on-call    At Home after Surgery:    Head Elevation: Keep your head elevated (the height of 2 pillows is appropriate) for 3 days to help with swelling.     Ice: Apply cold compresses to the cheeks and forehead, up to 20 minutes of each hour while awake after surgery, for the first 48 hours.  This will help reduce swelling and bruising.     Nasal Packing: If you have nasal packing in place with strings hanging out of your nose, you will remove it at home 24 or 48 hours after the operation (as directed by Dr. Oscar) by pulling on the strings beneath the nose.  Please call the office if you are struggling to remove it.  If you have splints inside the nose that are sutured into place, they will be removed at your follow-up appointment.    Nasal Care: Use nasal saline spray, 4 sprays to each nostril every 2-3 hours while awake.  This will help keep the nasal passages moist and prevent scabbing in the nose.  It is normal to feel congested for several weeks after surgery.  Do not blow your nose for two weeks after surgery.      Incision/Cast Care: If you have an incision on the nose, apply antibiotic ointment four times a day.  The incision will heal most optimally if it is kept moist and clean.  You can use hydrogen peroxide on Q-tips to gently clean any crusts.  Do not rub but gently dab the incision to clean.   If you have a cast or dressing on the outside of your nose, this will remain in place until follow-up.  If the cast falls off, do not worry.  Tape it to your nose at nighttime while you sleep and if/when you wear glasses.      Shower: You may shower 24 hours after surgery.  Do not let the shower spray hit your face/nose directly and do not soak your face in water.  If you have a cast or  dressing on the outside of the nose, try to keep it as dry as possible.  Towel blot your nose/cast after your shower.    Bleeding: Most patients have mild, active bleeding the first night.  Some blood-tinged drainage is normal for 1-2 weeks after surgery.  Afrin nasal spray may be helpful for bleeding after surgery but should not be used for more than 3 days.  Excessive bleeding that does not stop is not expected; please call the office or seek medical attention if this occurs.    Medications: Take the medications as prescribed.  You can take Tylenol in addition to the narcotic pain medication prescribed.  Resume all home medications the night of surgery unless otherwise directed.  Avoid aspirin and NSAIDs for one week after surgery.     Activity: Resume normal activities of daily living, as you feel able.  However, avoid strenuous activity and heavy lifting (more than 10 lbs) for 3 weeks after surgery.  Light activity such as walking may be resumed after 1 week after surgery.  Sport activities may be resumed 1 month after surgery but try to protect your nose as it is still healing.    Seek Medical Attention: Call the office or seek medical attention if you develop fever greater than 101 degrees, excessive bleeding, excessive pain that is not well-controlled, skin rash, visual disturbances, or other unusual symptoms.     Follow-Up Care:  First Appointment: You will return one week after surgery for an appointment for suture and cast/dressing removal.  There are additional sutures inside your nose that will dissolve on their own.    Postoperative Healing: Your nose will be swollen and will remain so for several weeks.  It is important to keep in mind that although much of the swelling resolves over the first several weeks after surgery, it takes 12 to 15 months for all of the swelling in the nose to resolve.  However, most patients have a good appearance even 2-3 weeks after the operation.      Additional  Appointments: Ideally, we would like to see you back between within 1-2 weeks after surgery. Subsequently, our follow up will be approximately 4-6 weeks, then 4-6 months after surgery to examine the healing. After this, the follow-up is quite variable, and depends on how you are doing and feeling. Often, this means visits at about 12 months after surgery to follow your healing process.  Please call the office at any time if you have any questions or concerns and would like to be seen sooner than your next scheduled visit.      Tylenol taken at 7:20 am due at 1:20 pm if needed    IV Motrin taken at 9:25 am due at 3:25 pm in needed

## 2024-09-17 NOTE — ANESTHESIA PROCEDURE NOTES
Airway  Date/Time: 9/17/2024 7:41 AM  Urgency: elective    Airway not difficult    Staffing  Performed: HERBERT   Authorized by: Miquel Odom MD    Performed by: HERBERT Stockton  Patient location during procedure: OR    Indications and Patient Condition  Indications for airway management: anesthesia  Spontaneous Ventilation: absent  Sedation level: deep  Preoxygenated: yes  Patient position: sniffing  MILS maintained throughout  Mask difficulty assessment: 1 - vent by mask  Planned trial extubation    Final Airway Details  Final airway type: endotracheal airway      Successful airway: ISABELLA tube and ETT  Cuffed: yes   Successful intubation technique: video laryngoscopy  Blade: Gilbert  Blade size: #4  ETT size (mm): 7.5  Cormack-Lehane Classification: grade I - full view of glottis  Measured from: lips  ETT to lips (cm): 22  Number of attempts at approach: 1  Ventilation between attempts: BVM  Number of other approaches attempted: 1    Other Attempts  Unsuccessful attempted endotracheal techniques: direct laryngoscopy    Additional Comments  Lips/teeth in pre-anesthetic condition.

## 2024-09-18 ENCOUNTER — TELEPHONE (OUTPATIENT)
Dept: OTOLARYNGOLOGY | Facility: CLINIC | Age: 19
End: 2024-09-18
Payer: COMMERCIAL

## 2024-09-18 NOTE — PROGRESS NOTES
Facial Plastic & Reconstructive Surgery      POV s/p nasal surgery on septoplasty, septal graft, nasal valve repair, turbinate reduction  9/19/24.     Doing well, endorses improved breathing bilaterally  Continues salt water sprays and ointment to nares    Nasal splint removed  Septum midline  Nasal airway patent  Incisions c/d/I    Plan:  Continue nasal saline sprays and ointment to nares  RTC 4-6 months or sooner if needed     Deyanira Phelan PA-C

## 2024-09-18 NOTE — TELEPHONE ENCOUNTER
Called Angelo to check on the status of his recovery. Patient is POD 1 Nasal airway surgery performed by Dr. Oscar at [facility] on 9/17/2024. Patient is doing well, reported some discomfort but tolerable with the prescribed pain medication. Patient and mother stated that patient has a increase in bleeding when patient gets up to walk to the restroom. The mother stated she changed his nasal dressing two times within the last hour. Patient encouraged to continue the saline sprays and ointment. Reviewed proper use of Afrin nasal spray. Patient reminded of post op appointment scheduled on 9/23/24. Dr. Oscar updated on patient status and office number left for any further questions, patient may have.

## 2024-09-19 ENCOUNTER — APPOINTMENT (OUTPATIENT)
Dept: OTOLARYNGOLOGY | Facility: CLINIC | Age: 19
End: 2024-09-19
Payer: COMMERCIAL

## 2024-09-19 DIAGNOSIS — M95.0 ACQUIRED DEFORMITY OF NOSE: ICD-10-CM

## 2024-09-19 DIAGNOSIS — G89.18 POSTOPERATIVE PAIN: ICD-10-CM

## 2024-09-19 DIAGNOSIS — J34.3 HYPERTROPHY OF NASAL TURBINATES: ICD-10-CM

## 2024-09-19 DIAGNOSIS — R09.81 NASAL CONGESTION: ICD-10-CM

## 2024-09-19 DIAGNOSIS — J34.2 DEVIATED NASAL SEPTUM: ICD-10-CM

## 2024-09-19 RX ORDER — OXYCODONE HYDROCHLORIDE 5 MG/1
5 TABLET ORAL EVERY 6 HOURS PRN
Qty: 12 TABLET | Refills: 0 | Status: SHIPPED | OUTPATIENT
Start: 2024-09-19 | End: 2024-09-22

## 2024-09-20 ENCOUNTER — APPOINTMENT (OUTPATIENT)
Dept: OTOLARYNGOLOGY | Facility: CLINIC | Age: 19
End: 2024-09-20
Payer: COMMERCIAL

## 2024-09-20 DIAGNOSIS — H65.21 RIGHT CHRONIC SEROUS OTITIS MEDIA: Primary | ICD-10-CM

## 2024-09-20 PROCEDURE — 69433 CREATE EARDRUM OPENING: CPT | Performed by: OTOLARYNGOLOGY

## 2024-09-20 NOTE — PROGRESS NOTES
Patient returns.  Was seeing him back today as he has developed persistent effusion in the right ear despite aggressive medical management.  Patient denies any other worrisome issues.  Had uneventful septoplasty etc. with our colleague several days ago.  There have been no significant changes in past medical or past surgical histories except as mentioned.    Exam  Persistent chronic effusion with drum retraction right side only.  Remaining is deferred.    Procedure:  After verbal informed consent, patient underwent dedicated topical phenol anesthesia to the posterior inferior aspect of the right tympanic membrane with myringotomy and suctioning free of any effusion along with placement of a grommet type tube.  Patient tolerated procedure well.  No complications and minimal blood loss.    Assessment and plan:  Right chronic serous otitis status post placement of tube in the office today.  Favor observation.  Recheck with me 4 to 6 months, sooner with issue.  All questions were answered in this regard accordingly.

## 2024-09-23 ENCOUNTER — OFFICE VISIT (OUTPATIENT)
Dept: OTOLARYNGOLOGY | Facility: CLINIC | Age: 19
End: 2024-09-23
Payer: COMMERCIAL

## 2024-09-23 ENCOUNTER — APPOINTMENT (OUTPATIENT)
Dept: OTOLARYNGOLOGY | Facility: CLINIC | Age: 19
End: 2024-09-23
Payer: COMMERCIAL

## 2024-09-23 VITALS — TEMPERATURE: 97.3 F | HEIGHT: 74 IN | WEIGHT: 168.2 LBS | BODY MASS INDEX: 21.58 KG/M2

## 2024-09-23 DIAGNOSIS — R09.81 NASAL CONGESTION: ICD-10-CM

## 2024-09-23 DIAGNOSIS — M95.0 ACQUIRED DEFORMITY OF NOSE: ICD-10-CM

## 2024-09-23 DIAGNOSIS — J34.3 HYPERTROPHY OF NASAL TURBINATES: ICD-10-CM

## 2024-09-23 DIAGNOSIS — J34.2 DEVIATED NASAL SEPTUM: Primary | ICD-10-CM

## 2024-09-23 PROCEDURE — 1036F TOBACCO NON-USER: CPT | Performed by: PHYSICIAN ASSISTANT

## 2024-09-23 PROCEDURE — 99024 POSTOP FOLLOW-UP VISIT: CPT | Performed by: PHYSICIAN ASSISTANT

## 2024-09-23 PROCEDURE — 3008F BODY MASS INDEX DOCD: CPT | Performed by: PHYSICIAN ASSISTANT

## 2024-09-23 RX ORDER — MUPIROCIN 20 MG/G
OINTMENT TOPICAL
Qty: 30 G | Refills: 1 | Status: SHIPPED | OUTPATIENT
Start: 2024-09-23

## 2024-09-23 RX ORDER — SODIUM CHLORIDE 0.65 %
2 AEROSOL, SPRAY (ML) NASAL 3 TIMES DAILY
Qty: 44 ML | Refills: 3 | Status: SHIPPED | OUTPATIENT
Start: 2024-09-23 | End: 2024-10-23

## 2024-09-23 ASSESSMENT — PAIN SCALES - GENERAL: PAINLEVEL: 4

## 2024-09-23 ASSESSMENT — PATIENT HEALTH QUESTIONNAIRE - PHQ9
2. FEELING DOWN, DEPRESSED OR HOPELESS: NOT AT ALL
SUM OF ALL RESPONSES TO PHQ9 QUESTIONS 1 AND 2: 0
1. LITTLE INTEREST OR PLEASURE IN DOING THINGS: NOT AT ALL

## 2024-09-23 NOTE — PROGRESS NOTES
Facial Plastic & Reconstructive Surgery      POV s/p nasal surgery on 9/17/24    Doing well, endorses improved breathing bilaterally  Continues salt water sprays and ointment to nares    Nasal splint removed  Septum midline  Nasal airway patent  Incisions c/d/I    Plan:  Continue nasal saline sprays and ointment to nares  RTC 4-6 months or sooner if needed     Deyanira Phelan PA-C

## 2024-09-26 ENCOUNTER — APPOINTMENT (OUTPATIENT)
Dept: OTOLARYNGOLOGY | Facility: CLINIC | Age: 19
End: 2024-09-26
Payer: COMMERCIAL

## 2025-03-21 ENCOUNTER — APPOINTMENT (OUTPATIENT)
Dept: OTOLARYNGOLOGY | Facility: CLINIC | Age: 20
End: 2025-03-21
Payer: COMMERCIAL

## 2025-03-27 ENCOUNTER — OFFICE VISIT (OUTPATIENT)
Dept: PRIMARY CARE | Facility: CLINIC | Age: 20
End: 2025-03-27
Payer: COMMERCIAL

## 2025-03-27 ENCOUNTER — HOSPITAL ENCOUNTER (OUTPATIENT)
Dept: RADIOLOGY | Facility: CLINIC | Age: 20
Discharge: HOME | End: 2025-03-27
Payer: COMMERCIAL

## 2025-03-27 VITALS
OXYGEN SATURATION: 99 % | HEART RATE: 68 BPM | BODY MASS INDEX: 22.33 KG/M2 | HEIGHT: 74 IN | TEMPERATURE: 96.6 F | SYSTOLIC BLOOD PRESSURE: 106 MMHG | DIASTOLIC BLOOD PRESSURE: 60 MMHG | RESPIRATION RATE: 16 BRPM | WEIGHT: 174 LBS

## 2025-03-27 DIAGNOSIS — R52 PAIN: Primary | ICD-10-CM

## 2025-03-27 DIAGNOSIS — M54.50 ACUTE BILATERAL LOW BACK PAIN WITHOUT SCIATICA: ICD-10-CM

## 2025-03-27 DIAGNOSIS — G43.911 INTRACTABLE MIGRAINE WITH STATUS MIGRAINOSUS, UNSPECIFIED MIGRAINE TYPE: ICD-10-CM

## 2025-03-27 DIAGNOSIS — R52 PAIN: ICD-10-CM

## 2025-03-27 PROCEDURE — 72110 X-RAY EXAM L-2 SPINE 4/>VWS: CPT

## 2025-03-27 RX ORDER — NAPROXEN 500 MG/1
500 TABLET ORAL 2 TIMES DAILY PRN
Qty: 60 TABLET | Refills: 0 | Status: SHIPPED | OUTPATIENT
Start: 2025-03-27 | End: 2025-03-28 | Stop reason: SDUPTHER

## 2025-03-27 RX ORDER — TIZANIDINE 4 MG/1
4 TABLET ORAL NIGHTLY
Qty: 10 TABLET | Refills: 0 | Status: SHIPPED | OUTPATIENT
Start: 2025-03-27 | End: 2025-04-06

## 2025-03-27 RX ORDER — CYPROHEPTADINE HYDROCHLORIDE 4 MG/1
4 TABLET ORAL 3 TIMES DAILY
Qty: 30 TABLET | Refills: 0 | Status: SHIPPED | OUTPATIENT
Start: 2025-03-27 | End: 2025-06-25

## 2025-03-27 ASSESSMENT — ENCOUNTER SYMPTOMS
MYALGIAS: 1
DIZZINESS: 0
ARTHRALGIAS: 1
CONSTITUTIONAL NEGATIVE: 1
BACK PAIN: 1
BRUISES/BLEEDS EASILY: 0
CHEST TIGHTNESS: 0
HEADACHES: 1
SHORTNESS OF BREATH: 0
ENDOCRINE NEGATIVE: 1
PHOTOPHOBIA: 1
WEAKNESS: 0
FATIGUE: 0

## 2025-03-27 ASSESSMENT — PATIENT HEALTH QUESTIONNAIRE - PHQ9
SUM OF ALL RESPONSES TO PHQ9 QUESTIONS 1 AND 2: 2
10. IF YOU CHECKED OFF ANY PROBLEMS, HOW DIFFICULT HAVE THESE PROBLEMS MADE IT FOR YOU TO DO YOUR WORK, TAKE CARE OF THINGS AT HOME, OR GET ALONG WITH OTHER PEOPLE: NOT DIFFICULT AT ALL
1. LITTLE INTEREST OR PLEASURE IN DOING THINGS: SEVERAL DAYS
2. FEELING DOWN, DEPRESSED OR HOPELESS: SEVERAL DAYS

## 2025-03-27 NOTE — PROGRESS NOTES
"Subjective   Patient ID: Angelo Salinas is a 19 y.o. male who presents for Back Pain (Pt is here for lower back pain and discomfort. He states pain has been on and off for a couple of months. He states no injury but he does play sports. Ibuprofen does help.  ).    HPI     Review of Systems   Constitutional: Negative.  Negative for fatigue.   Eyes:  Positive for photophobia and visual disturbance.   Respiratory:  Negative for chest tightness and shortness of breath.    Cardiovascular:  Negative for chest pain.   Endocrine: Negative.    Musculoskeletal:  Positive for arthralgias, back pain and myalgias.   Neurological:  Positive for headaches. Negative for dizziness and weakness.   Hematological:  Does not bruise/bleed easily.       Objective   /60   Pulse 68   Temp 35.9 °C (96.6 °F)   Resp 16   Ht 1.88 m (6' 2\")   Wt 78.9 kg (174 lb)   SpO2 99%   BMI 22.34 kg/m²     Physical Exam  Constitutional:       Appearance: Normal appearance.   Cardiovascular:      Rate and Rhythm: Normal rate and regular rhythm.   Pulmonary:      Effort: Pulmonary effort is normal.      Breath sounds: Normal breath sounds.   Musculoskeletal:      Lumbar back: Tenderness present.   Lymphadenopathy:      Cervical: No cervical adenopathy.   Neurological:      General: No focal deficit present.      Mental Status: He is alert and oriented to person, place, and time.   Psychiatric:         Mood and Affect: Mood normal.         Assessment/Plan   Problem List Items Addressed This Visit    None  Visit Diagnoses         Codes    Pain    -  Primary R52    Relevant Medications    naproxen (Naprosyn) 500 mg tablet    tiZANidine (Zanaflex) 4 mg tablet    Other Relevant Orders    XR lumbar spine complete 4+ views    Acute bilateral low back pain without sciatica     M54.50    Relevant Medications    naproxen (Naprosyn) 500 mg tablet    tiZANidine (Zanaflex) 4 mg tablet    Intractable migraine with status migrainosus, unspecified migraine " type     G43.911

## 2025-03-28 DIAGNOSIS — R52 PAIN: ICD-10-CM

## 2025-03-28 DIAGNOSIS — M54.50 ACUTE BILATERAL LOW BACK PAIN WITHOUT SCIATICA: ICD-10-CM

## 2025-03-28 RX ORDER — NAPROXEN 500 MG/1
500 TABLET ORAL 2 TIMES DAILY PRN
Qty: 60 TABLET | Refills: 1 | Status: SHIPPED | OUTPATIENT
Start: 2025-03-28 | End: 2026-03-23

## 2025-03-28 NOTE — TELEPHONE ENCOUNTER
Patient requests prescription below    Last Office Visit: 3/27/2025   Next Office Visit: Visit date not found     Requested Prescriptions     Pending Prescriptions Disp Refills    naproxen (Naprosyn) 500 mg tablet 60 tablet 0     Sig: Take 1 tablet (500 mg) by mouth 2 times a day as needed for mild pain (1 - 3) (pain).

## 2025-03-31 ENCOUNTER — TELEPHONE (OUTPATIENT)
Dept: PRIMARY CARE | Facility: CLINIC | Age: 20
End: 2025-03-31
Payer: COMMERCIAL

## 2025-03-31 NOTE — TELEPHONE ENCOUNTER
----- Message from Cm Epstein sent at 3/31/2025  2:08 PM EDT -----  Call Angelo Salinas Their labs were normal  His lumbar spine x-ray was normal

## 2025-04-02 ENCOUNTER — APPOINTMENT (OUTPATIENT)
Dept: OTOLARYNGOLOGY | Facility: CLINIC | Age: 20
End: 2025-04-02
Payer: COMMERCIAL

## 2025-04-09 ENCOUNTER — TELEPHONE (OUTPATIENT)
Dept: PRIMARY CARE | Facility: CLINIC | Age: 20
End: 2025-04-09
Payer: COMMERCIAL

## 2025-04-09 ENCOUNTER — PATIENT MESSAGE (OUTPATIENT)
Dept: PRIMARY CARE | Facility: CLINIC | Age: 20
End: 2025-04-09
Payer: COMMERCIAL

## 2025-04-09 DIAGNOSIS — M54.50 ACUTE BILATERAL LOW BACK PAIN WITHOUT SCIATICA: Primary | ICD-10-CM

## 2025-04-09 NOTE — TELEPHONE ENCOUNTER
----- Message from Cm Epstein sent at 4/8/2025  1:47 PM EDT -----  See message  ----- Message -----  From: Cm Epstein DO  Sent: 4/7/2025  12:00 AM EDT  To: Cm Epstein DO    How is back pain with naproxen and zanaflex

## 2025-04-24 ENCOUNTER — EVALUATION (OUTPATIENT)
Dept: PHYSICAL THERAPY | Facility: CLINIC | Age: 20
End: 2025-04-24
Payer: COMMERCIAL

## 2025-04-24 DIAGNOSIS — M54.50 ACUTE BILATERAL LOW BACK PAIN WITHOUT SCIATICA: ICD-10-CM

## 2025-04-24 PROCEDURE — 97161 PT EVAL LOW COMPLEX 20 MIN: CPT | Mod: GP | Performed by: PHYSICAL THERAPIST

## 2025-04-24 PROCEDURE — 97110 THERAPEUTIC EXERCISES: CPT | Mod: GP | Performed by: PHYSICAL THERAPIST

## 2025-04-24 ASSESSMENT — PAIN SCALES - GENERAL: PAINLEVEL_OUTOF10: 3

## 2025-04-24 ASSESSMENT — ENCOUNTER SYMPTOMS
DEPRESSION: 0
LOSS OF SENSATION IN FEET: 0
OCCASIONAL FEELINGS OF UNSTEADINESS: 0

## 2025-04-24 ASSESSMENT — PAIN - FUNCTIONAL ASSESSMENT: PAIN_FUNCTIONAL_ASSESSMENT: 0-10

## 2025-04-24 ASSESSMENT — PATIENT HEALTH QUESTIONNAIRE - PHQ9
1. LITTLE INTEREST OR PLEASURE IN DOING THINGS: NOT AT ALL
2. FEELING DOWN, DEPRESSED OR HOPELESS: NOT AT ALL
SUM OF ALL RESPONSES TO PHQ9 QUESTIONS 1 AND 2: 0

## 2025-04-24 NOTE — PROGRESS NOTES
Physical Therapy Evaluation    Patient Name: Angelo Salinas  MRN: [id number]  Today's Date: 4/24/2025  Time Calculation  Start Time: 1504  Stop Time: 1540  Time Calculation (min): 36 min  PT Evaluation Time Entry  PT Evaluation (Low) Time Entry: 20  PT Therapeutic Procedures Time Entry  Therapeutic Exercise Time Entry: 11  Therapeutic Activity Time Entry: 5                   Current Problem  1. Acute bilateral low back pain without sciatica  Referral to Physical Therapy    Follow Up In Physical Therapy        Insurance    Insurance reviewed   Visit number: 1  Approved number of visits: 30 V then auth      [employer] 30V THEN AUTH IS REQUIRED  [id number]   Subjective   General:  Patient is a 19 year old male  presenting with complaints of lower back pain. He has had this issue off and on for a while. It increased in February. He reports that he played in a volleyball league. Then there was a game where he played very hard and then the pain elevated. Patient denies any radiating pain to his legs. Aggravating activities include bending forward and lifting objects. Sitting for prolonged periods of time is OK. When he stands or walks for a while, this will increase his discomfort. He describes the pain as aching. He reports that he has been taking naproxen and a muscle relaxer when things are worse. He will use lidocaine patches that seem to help. 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 Patient denies any previous back injuries. Patient denies any back surgeries. He reports that his pain is usually worse at the end of the day. Patient denies any previous cardiovascular or respiratory issue. Patient reports a history of right broken foot 2 years ago, left broken clavicle 15 years ago  and left orbital fracture 6 years ago. The patient's goals for therapy are to improve comfort with daily activities and not having to think about his back . Patient likes to play basketball. But he  has been cutting back. He is able to play but he is sore after.   Precautions:    Pain:  Current: 3/10  Worst: 6/10  Reviewed medical screening form with pt and medical screening assessed    Imaging:     Objective   Observation: Resting lumbar lordosis with exaggerated thoracic kyphosis  Screening:      Lower Quarter Screen    -Passive SLR: R-  50 degrees L- 50  degrees      Transfers: Normal  Gait: Normal    Lumbar ROM (* indicates pain)  Flex:  75 % full range  Ext:  75  % full range, pain  Sidebending: R-  75 % full range  L-  75 % full range pain  Rotation: R- 80  % full range L-  80 % full range      Repeated loaded flexion: tightness no worse  Repeated loaded ext: end range pain, worse    Supine Hip PROM (* indicates pain)  Flexion: L 120  degrees ; R  120  degrees  IR: L 35  degrees; R 45   degrees  ER: L 45  degrees; R:  45 degrees    Hip MMT and Muscular Performance (* indicates pain)    Ext: L   4-/5; R  4-/5  ABD: L   4-/5; R   4-/5    SL hip bridge: R- 50% full hip extension, contralateral hip drop L-50% hip extension, contralateral hip drop             Accessory mobility:  2/6 mobility T4-T10  4/6 mobility L1-S1  Palpation:   -Increased resting tension through bilateral lumbosacral paraspinal musculature    Outcome Measures:  Other Measures  Oswestry Disablity Index (CARLOS ALBERTO): 9     Treatment:   -Patient education regarding recovery timeline, rehabilitation process and rehabilitation timeline, home exercise program demonstration and construction, review of precautions, and long term strategies to maximize functional capacity and functional independence 5 minutes  -Seated thoracic rotation 1x10 bilaterally  -Seated Thoracic Extension over chair 1x10 bilaterally  -Sidelying hip abduction 1x10  -Single leg hip bridge 2x10  EDUCATION/HEP:  Access Code: [email or web address]  URL: [email or web address]  Date: 04/24/2025  Prepared by: Jay Guzman    Exercises  - Seated Trunk Rotation  - 5 x daily - 7 x  weekly - 5 reps  - Seated Thoracic Lumbar Extension  - 5 x daily - 7 x weekly - 5 reps  - Figure 4 Bridge  - 1 x daily - 7 x weekly - 3 sets - 10 reps  - Sidelying Hip Abduction  - 1 x daily - 7 x weekly - 3 sets - 10 reps  Assessment:  Patient is a 19 year old male  presenting with lower . Patient presents with the following primary physical and functional impairments and limitations:  pain and limitations with lumbar spine flexion and extension, proximal hip muscular weakness, limited thoracic spine accessory mobility, limited thoracic spine extension and rotation AROM and impaired repetitive lifting/bending/twisting/standing/walking activity capacity secondary to the abovementioned impairments.  Patient tolerated today's evaluation and treatment performed well. Patient is displaying good prognosis for physical therapy care based upon subjective and objective assessment. Patient will benefit from skilled intervention to address the above mentioned impairments to maximize functional capacity and quality of life. Patient appeared to understand all education provided.  No adverse reactions were observed or reported from patient at conclusion of today's session.           h    Goals:  Pt will report at least 75% improvement in  pain during everyday activities.  Pt will demo full and symmetrical AROM of lumbar spine without pain.  Pt will improve Oswestry by at least 10 points (MCID) to reflect improvement in ADLs/pain reduction.   Pt will demonstrate independence and report compliance with HEP.  Pt. Will report return to playing basketball with minimal pain/limitation, indicating improved desired functional capacity   Patient will display 5/5 bilateral hip abduction and extension strength    Plan  1x/week for  6 visits     Skilled therapeutic intervention to address the above mentioned physical and functional impairments and limitations including, but not limited to: patient education, therapeutic exercise,  therapeutic activity, manual therapy, body mechanics training, dry needling, blood flow restriction training, instrumented soft tissue mobilization, manual soft tissue mobilization, gait retraining, biofeedback, cryotherapy, electrical stimulation, home program development, hot pack, taping, neuromuscular re-education, self-care/home management, and vasopneumatic compression.

## 2025-05-07 ENCOUNTER — TELEPHONE (OUTPATIENT)
Dept: PRIMARY CARE | Facility: CLINIC | Age: 20
End: 2025-05-07
Payer: COMMERCIAL

## 2025-05-07 NOTE — TELEPHONE ENCOUNTER
----- Message from Cm Epstein sent at 5/5/2025 10:04 PM EDT -----  See message  ----- Message -----  From: Cm Epstein DO  Sent: 5/5/2025  12:00 AM EDT  To: Cm Epstein DO    How is periactin helping with eye strain/headache

## 2025-08-01 ENCOUNTER — APPOINTMENT (OUTPATIENT)
Dept: OTOLARYNGOLOGY | Facility: CLINIC | Age: 20
End: 2025-08-01
Payer: COMMERCIAL

## 2025-08-01 DIAGNOSIS — H65.21 RIGHT CHRONIC SEROUS OTITIS MEDIA: Primary | ICD-10-CM

## 2025-08-01 PROCEDURE — 99213 OFFICE O/P EST LOW 20 MIN: CPT

## 2025-08-18 ENCOUNTER — APPOINTMENT (OUTPATIENT)
Dept: PRIMARY CARE | Facility: CLINIC | Age: 20
End: 2025-08-18
Payer: COMMERCIAL

## 2025-08-18 VITALS
RESPIRATION RATE: 20 BRPM | TEMPERATURE: 99.5 F | HEART RATE: 60 BPM | HEIGHT: 74 IN | WEIGHT: 170 LBS | SYSTOLIC BLOOD PRESSURE: 100 MMHG | BODY MASS INDEX: 21.82 KG/M2 | DIASTOLIC BLOOD PRESSURE: 62 MMHG

## 2025-08-18 DIAGNOSIS — H65.21 RIGHT CHRONIC SEROUS OTITIS MEDIA: ICD-10-CM

## 2025-08-18 DIAGNOSIS — R52 PAIN: Primary | ICD-10-CM

## 2025-08-18 DIAGNOSIS — J30.1 NON-SEASONAL ALLERGIC RHINITIS DUE TO POLLEN: ICD-10-CM

## 2025-08-18 DIAGNOSIS — M54.50 ACUTE BILATERAL LOW BACK PAIN WITHOUT SCIATICA: ICD-10-CM

## 2025-08-18 DIAGNOSIS — G43.909 MIGRAINE WITHOUT STATUS MIGRAINOSUS, NOT INTRACTABLE, UNSPECIFIED MIGRAINE TYPE: ICD-10-CM

## 2025-08-18 PROCEDURE — 99213 OFFICE O/P EST LOW 20 MIN: CPT | Performed by: FAMILY MEDICINE

## 2025-08-18 RX ORDER — CETIRIZINE HYDROCHLORIDE 10 MG/1
10 TABLET ORAL DAILY
Qty: 30 TABLET | Refills: 5 | Status: SHIPPED | OUTPATIENT
Start: 2025-08-18 | End: 2026-02-14

## 2025-08-18 RX ORDER — NAPROXEN 500 MG/1
500 TABLET ORAL 2 TIMES DAILY PRN
Qty: 60 TABLET | Refills: 1 | Status: SHIPPED | OUTPATIENT
Start: 2025-08-18 | End: 2026-08-13

## 2025-08-19 ASSESSMENT — ENCOUNTER SYMPTOMS
POLYDIPSIA: 0
WEAKNESS: 0
POLYPHAGIA: 0
ARTHRALGIAS: 0
FATIGUE: 0
SHORTNESS OF BREATH: 0
DIARRHEA: 0
APPETITE CHANGE: 0
CHEST TIGHTNESS: 0
DIZZINESS: 0
MYALGIAS: 0
NUMBNESS: 0
HEADACHES: 1
NAUSEA: 0
FREQUENCY: 0
VOMITING: 0

## 2025-10-28 ENCOUNTER — APPOINTMENT (OUTPATIENT)
Dept: PRIMARY CARE | Facility: CLINIC | Age: 20
End: 2025-10-28
Payer: COMMERCIAL

## 2026-02-04 ENCOUNTER — APPOINTMENT (OUTPATIENT)
Dept: OTOLARYNGOLOGY | Facility: CLINIC | Age: 21
End: 2026-02-04
Payer: COMMERCIAL

## (undated) DEVICE — STRIP, SKIN CLOSURE, STERI STRIP, REINFORCED, 0.5 X 4 IN

## (undated) DEVICE — APPLICATOR, COTTON TIP, 3 IN, WOOD, 10-PACK, STERILE

## (undated) DEVICE — NEEDLE, MICRODISSECTION STR 4CM

## (undated) DEVICE — SYRINGE, 10 CC, LUER LOCK

## (undated) DEVICE — Device

## (undated) DEVICE — CONTAINER STERILE SPECIMEN 90ML, STERILE

## (undated) DEVICE — NEEDLE, HYPODERMIC, REGULAR WALL, REGULAR BEVEL, 18 G X 1.5 IN

## (undated) DEVICE — SUTURE, CHROMIC GUT 5/0  18  P-13"

## (undated) DEVICE — CLEANER, WIPE, INSTRUMENT, 3.25 X 3.25 IN

## (undated) DEVICE — ADHESIVE, SKIN, MASTISOL, 2/3 CC VIAL

## (undated) DEVICE — SYRINGE, MONOJECT, LUER LOCK, 3 CC, LF

## (undated) DEVICE — CATHETER, DRAINAGE, NASOGASTRIC, SUMP, SALEM, W/ANTI-REFLUX VALVE, 18 FR, 48 IN

## (undated) DEVICE — GLOVE, SURGICAL, PROTEXIS PI BLUE W/NEUTHERA, 7.5, PF, LF

## (undated) DEVICE — SYRINGE, 20 CC, LUER LOCK, MONOJECT, W/O CAP, LF

## (undated) DEVICE — MARKER, SKIN, REGULAR TIP, W/W/FLEXI RULER, LABEL

## (undated) DEVICE — SPONGE, GAUZE, XRAY DECT, 16 PLY, 4 X 4, W/MASTER DMT,STERILE